# Patient Record
Sex: FEMALE | Race: WHITE | Employment: OTHER | URBAN - METROPOLITAN AREA
[De-identification: names, ages, dates, MRNs, and addresses within clinical notes are randomized per-mention and may not be internally consistent; named-entity substitution may affect disease eponyms.]

---

## 2018-03-26 ENCOUNTER — APPOINTMENT (OUTPATIENT)
Dept: PHYSICAL THERAPY | Age: 73
End: 2018-03-26

## 2018-03-27 ENCOUNTER — HOSPITAL ENCOUNTER (OUTPATIENT)
Dept: PHYSICAL THERAPY | Age: 73
Discharge: HOME | End: 2018-03-27
Payer: MEDICARE

## 2018-03-27 PROCEDURE — 97110 THERAPEUTIC EXERCISES: CPT

## 2018-03-27 PROCEDURE — 97162 PT EVAL MOD COMPLEX 30 MIN: CPT

## 2018-03-27 PROCEDURE — G8979 MOBILITY GOAL STATUS: HCPCS

## 2018-03-27 PROCEDURE — G8978 MOBILITY CURRENT STATUS: HCPCS

## 2018-03-27 NOTE — PROGRESS NOTES
PHYSICAL THERAPY - DAILY TREATMENT NOTE    Patient Name: Rosalva Graff        Date: 3/27/2018  : 1945   YES Patient  Verified  Visit #:   1   of  8  Insurance: Payor: Ashley Lab / Plan: [place] MEDICARE PART A & B / Product Type: Medicare /      In time: 245 Out time: 345p   Total Treatment Time: 60     Medicare Time Tracking (below)   Total Timed Codes (min):  15 1:1 Treatment Time:  60     TREATMENT AREA =  Bilateral knee pain [M25.561, M25.562]    SUBJECTIVE                                                         See IE            OBJECTIVE      15 min Therapeutic Exercise:  [x]  See flow sheet   Rationale:      increase ROM and increase strength to improve the patients ability to perform ADLs, IADLs. T/o tx min Patient Education:  YES  Reviewed HEP   [] A and P related to current DX [] LTGs and POC   []  Progressed/Changed HEP based on:         Other Objective/Functional Measures:                                   See IE           ASSESSMENT    [x]  See Initial Evaluation     PLAN    [x]  Upgrade activities as tolerated YES Continue plan of care   []  Discharge due to :    []  Other: Pt will return for follow up and to initiate POC     Therapist: Valentina Brice PT, Alvina QUIJANO 64    Date: 3/27/2018 Time: 2:42 PM

## 2018-03-27 NOTE — PROGRESS NOTES
[facility] Frank [address]  [facility] AT [facility]. [place], [address] - Phone: [phone]  Fax: [phone] / [address]  Patient Name: Naveed Blunt : 1945   Medical   Diagnosis: Bilateral knee pain [M25.561, M25.562] Treatment Diagnosis: B knee pain   Onset Date: 3 years     Referral Source: Marce Herrera MD [place] [facility]): 3/27/2018   Prior Hospitalization: See medical history Provider #: [id number]   Prior Level of Function: Walking step to pattern, LE weakness   Comorbidities: OA, DM, Synvisc injection L knee, Steroidal injections B. Medications: Verified on Patient Summary List     The Plan of Care and following information is based on the information from the initial evaluation.   ===========================================================================================  Assessment / key information:  Naveed Blunt is a 81 y.o.  yo female with Dx of Bilateral knee pain [M25.561, M25.562]. She reports origin is related to She currently rates her pain as aching and weakness in B knee, R > L at 8/10 at worst, 3/10 at best, improved since recent steroid injection. She reports feeling her knees are weak, per pt > 3 years, with R knee buckling frequently prior to injection. Pt reports falls related to R knee 1 1/2 years ago and 1 year ago. She complains of difficulty and increase pain with stairs, noting step to gait pattern. Goals are to get stronger and be active for as long as possible. Synvisc injections planned for R knee in 1-2 months. Objective Findings:  Gait: Pt with limited knee extension B during stance phase and min Trendelenberg, Knee AROM: R = 0-5-90 deg, L = 0-5-100 deg. Manual Muscle Testing:  Quad Set: R = poor with pain, L =fair without pain, unable to perform SLR without A, PPT with lumbar spasms.  Special Test: Pt unable to perform sit<>stand without use of UE. Pt demonstrates significant mm weakness in B LE and would benefit from PT to address LE strengthening, balance activity for safety. Pt instructed in HEP and will f/u in clinic for PT.  ======================================================= ===================================  Eval Complexity: History MEDIUM  Complexity : 1-2 comorbidities / personal factors will impact the outcome/ POC ;  Examination  MEDIUM Complexity : 3 Standardized tests and measures addressing body structure, function, activity limitation and / or participation in recreation ; Presentation MEDIUM Complexity : Evolving with changing characteristics ; Decision Making MEDIUM Complexity : FOTO score of 26-74; Overall Complexity MEDIUM  Problem List: pain affecting function, decrease ROM, decrease strength, impaired gait/ balance, decrease ADL/ functional abilitiies, decrease activity tolerance, decrease flexibility/ joint mobility and decrease transfer abilities   Treatment Plan may include any combination of the following: Therapeutic exercise, Therapeutic activities, Neuromuscular re-education, Physical agent/modality, Gait/balance training, Manual therapy, Patient education, Self Care training, Functional mobility training and Home safety training  Patient / Family readiness to learn indicated by: asking questions, trying to perform skills and interest  Persons(s) to be included in education: patient (P)  Barriers to Learning/Limitations: no  Measures taken:   None needed   Patient Goal (s): To be able to take care of grandchildren, have less pain. Rehabilitation Potential: good   Short Term Goals: To be accomplished in  1-2  weeks:  1. Independent with HEP for supine and seated there ex to improve pt status. 2. Assess Dynamic Gait Index and/or Briones Balance Score to assess current safety with transfers and gait status.   3. Pt will demonstrate ability to perform PPT and bridges without pain to indicate improved core strength and improve functional status.  Long Term Goals: To be accomplished in  3-4  weeks:  1. Improve DGI by 5 points to indicate improved balance for safe interaction in community. 2.  Increase FOTO score by 17 points to 49/100 pts to show functional improvement. 3.  Pt will demonstrate ability to perform sit<>stand without use of UE x 10 reps to indicate improved strength to improve ADLs, IADLs. 4. Improve Dynamic Gait Index by 3 or greater points to improve safety in community. Frequency / Duration:   Patient to be seen  2-3  times per week for 3-4  weeks:  Patient / Caregiver education and instruction: self care and exercises, modification of activity  G-Codes (GP): Mobility: [id number] Current  CL= 60-79%   Goal  CK= 40-59%. The severity rating is based on the FOTO Score  Therapist Signature: Purnima Haji PT Date: 7/07/5141   Certification Period: 3/27/18-6/26/18 Time: 2:42 PM   ===========================================================================================  I certify that the above Physical Therapy Services are being furnished while the patient is under my care. I agree with the treatment plan and certify that this therapy is necessary. Physician Signature:        Date:       Time:     Please sign and return to [facility] or you may fax the signed copy to [phone]. Thank you.

## 2018-03-30 ENCOUNTER — HOSPITAL ENCOUNTER (OUTPATIENT)
Dept: PHYSICAL THERAPY | Age: 73
End: 2018-03-30
Payer: MEDICARE

## 2018-04-02 ENCOUNTER — HOSPITAL ENCOUNTER (OUTPATIENT)
Dept: PHYSICAL THERAPY | Age: 73
Discharge: HOME | End: 2018-04-02
Payer: MEDICARE

## 2018-04-02 PROCEDURE — 97110 THERAPEUTIC EXERCISES: CPT

## 2018-04-02 PROCEDURE — 97140 MANUAL THERAPY 1/> REGIONS: CPT

## 2018-04-02 NOTE — PROGRESS NOTES
PHYSICAL THERAPY - DAILY TREATMENT NOTE      Patient Name: Sanam Davis        Date: 2018  : 1945   YES Patient  Verified  Visit #:   2   of   8  Insurance: Payor: Dennis Hooper / Plan: [place] MEDICARE PART A & B / Product Type: Medicare /      In time: 9 Out time: 1005   Total Treatment Time: 65     Medicare Time Tracking (below)   Total Timed Codes (min): 55 1:1 Treatment Time:  40     TREATMENT AREA =  Bilateral knee pain [M25.561, M25.562]    SUBJECTIVE    Pain Level (on 0 to 10 scale):  2  / 10   Medication Changes/New allergies or changes in medical history, any new surgeries or procedures? NO    If yes, update Summary List   Subjective Functional Status/Changes:  []  No changes reported       Functional improvements: R knee pain more severe today but was able to stand more yesterday for cooking. Functional impairments: Unable to squat, bend knee for transfers. OBJECTIVE  Modalities Rationale:     decrease edema and decrease inflammation to improve patient's ability to perform ADLs.       min [] Estim, type/location:                                      []  att     []  unatt     []  w/US     []  w/ice    []  w/heat    min []  Mechanical Traction: type/lbs                   []  pro   []  sup   []  int   []  cont    []  before manual    []  after manual    min []  Ultrasound, settings/location:      min []  Iontophoresis w/ dexamethasone, location:                                               []  take home patch       []  in clinic   10 min [x]  Ice     []  Heat    location/position: B knee in supine    min []  Vasopneumatic Device, press/temp:     min []  Other:    [x] Skin assessment post-treatment (if applicable):    [x]  intact    []  redness- no adverse reaction     []redness  adverse reaction:      45: 30 min 1:1 min Therapeutic Exercise:  [x]  See flow sheet   Rationale:      increase ROM, increase strength and improve coordination to improve the patients ability to perform ADLs.     10 min Manual Therapy: Technique:      [x] S/DTM []IASTM [x]PROM [] Passive Stretching   []manual TPR    [x]Jt manipulation:Gr I [x] II [x]  III [] IV[] V[]  Treatment Area:  R knee PROM, STM to quad. Rationale:      decrease pain and increase ROM to improve patient's ability to perform ADLs. throughout therapy min Patient Education:  YES  Reviewed HEP   []  Progressed/Changed HEP based on: Other Objective/Functional Measures:    Initiated LE strengthening program.  Added sit<>stand, standing static balance. Post Treatment Pain Level (on 0 to 10) scale:  2 / 10     ASSESSMENT    Assessment/Changes in Function:     Pt with significant LE weakness noted, inability to perform SLR R > 5 reps prior to fatigue. []  See Progress Note/Recertification   Patient will continue to benefit from skilled PT services to modify and progress therapeutic interventions, address functional mobility deficits, address ROM deficits, address strength deficits, analyze and address soft tissue restrictions, analyze and cue movement patterns, analyze and modify body mechanics/ergonomics and assess and modify postural abnormalities to attain remaining goals. Progress toward goals / Updated goals:    Pt with progress toward I with STG 1, I with HEP.      PLAN    [x]  Upgrade activities as tolerated YES Continue plan of care   []  Discharge due to :    [x]  Other: Update HEP     Therapist: Candance Manns, PT    Date: 4/2/2018 Time: 9:12 AM   Future Appointments  Date Time Provider Leander Muniz   4/4/2018 9:30 AM Cordova Fall, PT REHAB CENTER AT [facility]   4/6/2018 9:00 AM Kathy Youngblood PT REHAB CENTER AT [facility]   4/9/2018 10:30 AM Cordova Fall, PT REHAB CENTER AT [facility]   4/11/2018 8:30 AM Cordova Fall, PT REHAB CENTER AT [facility]   4/13/2018 9:00 AM Kathy Youngblood PT REHAB CENTER AT [facility]   4/16/2018 9:30 AM Cordova Fall, PT REHAB CENTER AT [facility]   4/18/2018 9:00 AM Cordova Fall, PT REHAB CENTER AT [facility]   4/23/2018 9:00 AM Omid Tan PT REHAB CENTER AT [facility]   4/25/2018 8:30 AM Masha Colvin Crystal Warren REHAB [facility]ENTER AT [facility]

## 2018-04-04 ENCOUNTER — HOSPITAL ENCOUNTER (OUTPATIENT)
Dept: PHYSICAL THERAPY | Age: 73
Discharge: HOME | End: 2018-04-04
Payer: MEDICARE

## 2018-04-04 PROCEDURE — 97112 NEUROMUSCULAR REEDUCATION: CPT

## 2018-04-04 PROCEDURE — 97140 MANUAL THERAPY 1/> REGIONS: CPT

## 2018-04-04 PROCEDURE — 97014 ELECTRIC STIMULATION THERAPY: CPT

## 2018-04-04 PROCEDURE — 97110 THERAPEUTIC EXERCISES: CPT

## 2018-04-04 NOTE — PROGRESS NOTES
PHYSICAL THERAPY - DAILY TREATMENT NOTE    Patient Name: Amna Hansen        Date: 2018  : 1945   YES Patient  Verified  Visit #:   3   of   8  Insurance: Payor: Dandre Hester / Plan: [place] MEDICARE PART A & B / Product Type: Medicare /      In time: 930 Out time: 1120   Total Treatment Time: 110     Medicare Time Tracking (below)   Total Timed Codes (min):  110 1:1 Treatment Time:  40     TREATMENT AREA = Bilateral knee pain [M25.561, M25.562]    SUBJECTIVE  Pain Level (on 0 to 10 scale):  2= R  / 10   Medication Changes/New allergies or changes in medical history, any new surgeries or procedures? NO    If yes, update Summary List   Subjective Functional Status/Changes:  []  No changes reported     R knee has constant discomfort. Sore after all the ex last time     Knees are weak.    OBJECTIVE    10 1:1, 60 min Therapeutic Exercise:  [x]  See flow sheet   Rationale:      increase ROM, increase strength and improve balance to improve the patients ability to amb/ perform ADLs     10 min Manual Therapy: Technique:      [x] STM[]IASTM[x]TPR[]PROM[x] Stretching- HS, knee ext  [] SOR[] man traction[]   []OP with REIL  []Jt manipulation []Gr I [] II []  III [] IV[] V[]    Treatment Area:  R quad   Rationale:      decrease pain, increase ROM, increase tissue extensibility and decrease trigger points to improve patient's ability to amb    20 min Neuro re-ed Exercise:  [x]  Completed RAMOS assessment   Rationale:      improve coordination, improve balance and increase proprioception to improve the patients ability to amb/ transition     Modalities Rationale:     decrease inflammation and decrease pain to improve patient's ability to amb with less pain  10 min [x] Estim, type/location: ifc                                    []  att     []  unatt     []  w/US     []  w/ice    []  w/heat    min []  Mechanical Traction: type/lbs                   []  pro   []  sup   []  int   []  cont    []  before manual    [] after manual    min []  Ultrasound, settings/location:      min []  Iontophoresis w/ dexamethasone, location:                                               []  take home patch       []  in clinic    min []  Ice     []  Heat    location/position:     min []  Vasopneumatic Device, press/temp:     min []  Other:    [x] Skin assessment post-treatment (if applicable):    [x]  intact    []  redness- no adverse reaction     []redness  adverse reaction:         min Gait Training:    Rationale:        throughout Rx min Patient Education:  YES  Reviewed HEP   []  Progressed/Changed HEP based on: Other Objective/Functional Measures:      RAMOS= 49/56   Post Treatment Pain Level (on 0 to 10) scale:   0  / 10     ASSESSMENT  Assessment/Changes in Function:     Functional improvement:  Progressed HEP-  Added PPT and clam   Functional limitation:  Balance dysfunction, weakness      []  See Progress Note/Recertification   Patient will continue to benefit from skilled PT services to modify and progress therapeutic interventions, address functional mobility deficits, address ROM deficits, address strength deficits, analyze and address soft tissue restrictions, analyze and cue movement patterns and address imbalance/dizziness to attain remaining goals.    Progress toward goals / Updated goals:    Cont with current LTGs     PLAN  [x]  Upgrade activities as tolerated YES Continue plan of care   []  Discharge due to :    []  Other:      Therapist: Wei Cerda PT    Date: 4/4/2018 Time: 9:33 AM     Future Appointments  Date Time Provider Leander Muniz   4/6/2018 9:00 AM Wei Cerda, PT REHAB [facility]ENTER AT [facility]   4/9/2018 10:30 AM Wei Cerda PT REHAB CENTER AT [facility]   4/11/2018 8:30 AM Wei Cerda PT REHAB CENTER AT [facility]   4/13/2018 9:00 AM Kathy Uriarte PT REHAB CENTER AT [facility]   4/16/2018 9:30 AM Wei Cerda, PT REHAB [facility]ENTER AT [facility]   4/18/2018 9:00 AM Wei Cerda, PT REHAB CENTER AT [facility]   4/23/2018 9:00 AM Wei Cerda, PT REHAB CENTER AT [facility]   4/25/2018 8:30 AM Patito Ballard, PT REHAB CENTER AT [facility]

## 2018-04-06 ENCOUNTER — HOSPITAL ENCOUNTER (OUTPATIENT)
Dept: PHYSICAL THERAPY | Age: 73
Discharge: HOME | End: 2018-04-06
Payer: MEDICARE

## 2018-04-06 PROCEDURE — 97110 THERAPEUTIC EXERCISES: CPT

## 2018-04-06 PROCEDURE — 97140 MANUAL THERAPY 1/> REGIONS: CPT

## 2018-04-06 NOTE — PROGRESS NOTES
PHYSICAL THERAPY - DAILY TREATMENT NOTE    Patient Name: Juliette Mckinley        Date: 2018  : 1945   YES Patient  Verified  Visit #:   4   of   8  Insurance: Payor: Gissell Pradhan / Plan: [place] MEDICARE PART A & B / Product Type: Medicare /      In time: 900 Out time: 3066   Total Treatment Time: 75     Medicare Time Tracking (below)   Total Timed Codes (min):  65 1:1 Treatment Time:  40     TREATMENT AREA = Bilateral knee pain [M25.561, M25.562]    SUBJECTIVE  Pain Level (on 0 to 10 scale):  3  / 10   Medication Changes/New allergies or changes in medical history, any new surgeries or procedures?     NO    If yes, update Summary List   Subjective Functional Status/Changes:  []  No changes reported     Sore from yesterday        OBJECTIVE    30 1:1, 45 min Therapeutic Exercise:  [x]  See flow sheet   Rationale:      increase ROM and increase strength to improve the patients ability to amb with less pain, perform ADLs     10 min Manual Therapy: Technique:      [x] STM[]IASTM[x]TPR[x]PROM[] Stretching  [] SOR[] man traction[]   []OP with REIL  []Jt manipulation []Gr I [] II []  III [] IV[] V[]    Treatment Area:  R distal quad   Rationale:      decrease pain, increase ROM, increase tissue extensibility and decrease trigger points to improve patient's ability to amb with less pain        Modalities Rationale:     decrease inflammation and decrease pain to improve patient's ability to amb with less pain  10 min [x] Estim, type/location: ifc to B knees                                     []  att     []  unatt     []  w/US     []  w/ice    []  w/heat    min []  Mechanical Traction: type/lbs                   []  pro   []  sup   []  int   []  cont    []  before manual    []  after manual    min []  Ultrasound, settings/location:      min []  Iontophoresis w/ dexamethasone, location:                                               []  take home patch       []  in clinic    min []  Ice     []  Heat location/position:     min []  Vasopneumatic Device, press/temp:     min []  Other:    [x] Skin assessment post-treatment (if applicable):    [x]  intact    [x]  redness- no adverse reaction     []redness  adverse reaction:         min Gait Training:    Rationale:        throughout Rx min Patient Education:  YES  Reviewed HEP   []  Progressed/Changed HEP based on: Other Objective/Functional Measures:    Tender B knees     Post Treatment Pain Level (on 0 to 10) scale:   2  / 10     ASSESSMENT  Assessment/Changes in Function:     Functional improvement:  Demos/ verbalizes compliance with HEP   Functional limitation:  Pain with prolonged walking      []  See Progress Note/Recertification   Patient will continue to benefit from skilled PT services to modify and progress therapeutic interventions, address functional mobility deficits, address ROM deficits, address strength deficits, analyze and address soft tissue restrictions and address imbalance/dizziness to attain remaining goals.    Progress toward goals / Updated goals:    Good knowledge of HEP     PLAN  [x]  Upgrade activities as tolerated YES Continue plan of care   []  Discharge due to :    []  Other:      Therapist: Patricia Massey PT    Date: 4/6/2018 Time: 9:11 AM     Future Appointments  Date Time Provider Leander Muniz   4/9/2018 10:30 AM [address] [place] [address], PT REHAB CENTER AT [facility]   4/11/2018 8:30 AM Patricia Massey PT REHAB CENTER AT [facility]   4/13/2018 9:00 AM Kathy Marie PT REHAB CENTER AT [facility]   4/16/2018 9:30 AM Patricia Massey PT REHAB CENTER AT [facility]   4/18/2018 9:00 AM Patricia Massey PT REHAB CENTER AT [facility]   4/23/2018 9:00 AM Patricia Massey PT REHAB CENTER AT [facility]   4/25/2018 8:30 AM Patricia Massey PT REHAB CENTER AT [facility]

## 2018-04-09 ENCOUNTER — HOSPITAL ENCOUNTER (OUTPATIENT)
Dept: PHYSICAL THERAPY | Age: 73
Discharge: HOME | End: 2018-04-09
Payer: MEDICARE

## 2018-04-09 PROCEDURE — 97140 MANUAL THERAPY 1/> REGIONS: CPT

## 2018-04-09 PROCEDURE — 97110 THERAPEUTIC EXERCISES: CPT

## 2018-04-09 NOTE — PROGRESS NOTES
PHYSICAL THERAPY - DAILY TREATMENT NOTE    Patient Name: Tom Mc        Date: 2018  : 1945   YES Patient  Verified  Visit #:   4   of   8  Insurance: Payor: Randall Avina / Plan: [place] MEDICARE PART A & B / Product Type: Medicare /      In time: 9510 Out time: 1150   Total Treatment Time: 80     Medicare Time Tracking (below)   Total Timed Codes (min):  70 1:1 Treatment Time:  40     TREATMENT AREA = Bilateral knee pain [M25.561, M25.562]    SUBJECTIVE  Pain Level (on 0 to 10 scale):  3  / 10   Medication Changes/New allergies or changes in medical history, any new surgeries or procedures? NO    If yes, update Summary List   Subjective Functional Status/Changes:  []  No changes reported     Saturday had a fall. Knee buckled and went out from under her. Landed on kneecap. Seeing MD tomorrow but no increase in pain at knee, noting soreness at wrist and ankle.      OBJECTIVE    60--1:1 30 min min Therapeutic Exercise:  [x]  See flow sheet   Rationale:      increase ROM and increase strength to improve the patients ability to amb with less pain, perform ADLs     10 min Manual Therapy: Technique:      [x] STM[]IASTM[x]TPR[x]PROM[] Stretching  [] SOR[] man traction[]   []OP with REIL  []Jt manipulation []Gr I [] II []  III [] IV[] V[]    Treatment Area:  R distal quad   Rationale:      decrease pain, increase ROM, increase tissue extensibility and decrease trigger points to improve patient's ability to amb with less pain    Modalities Rationale:     decrease inflammation and decrease pain to improve patient's ability to amb with less pain    min [] Estim, type/location:                                       []  att     []  unatt     []  w/US     []  w/ice    []  w/heat    min []  Mechanical Traction: type/lbs                   []  pro   []  sup   []  int   []  cont    []  before manual    []  after manual    min []  Ultrasound, settings/location:      min []  Iontophoresis w/ dexamethasone, location:                                               []  take home patch       []  in clinic   10 min [x]  Ice     []  Heat    location/position: Supine with LE elevation B knee    min []  Vasopneumatic Device, press/temp:     min []  Other:    [x] Skin assessment post-treatment (if applicable):    [x]  intact    [x]  redness- no adverse reaction     []redness  adverse reaction:        throughout Rx min Patient Education:  YES  Reviewed HEP   []  Progressed/Changed HEP based on: Other Objective/Functional Measures:  Reviewed HEP and discussed with pt use of a brace for stability at the knee. Post Treatment Pain Level (on 0 to 10) scale:   2  / 10     ASSESSMENT  Assessment/Changes in Function:     Functional improvement:  Increased PROM to 0-3-108  Functional limitation:  Fall on uneven surface. []  See Progress Note/Recertification   Patient will continue to benefit from skilled PT services to modify and progress therapeutic interventions, address functional mobility deficits, address ROM deficits, address strength deficits, analyze and address soft tissue restrictions and address imbalance/dizziness to attain remaining goals. Progress toward goals / Updated goals:    1. Independent with HEP for supine and seated there ex to improve pt status--met. 2. Assess Dynamic Gait Index and/or Briones Balance Score to assess current safety with transfers and gait status--met. 3. Pt will demonstrate ability to perform PPT and bridges without pain to indicate improved core strength and improve functional status--good progress currently able to perform PPT without pain.      PLAN  [x]  Upgrade activities as tolerated YES Continue plan of care   []  Discharge due to :    [x]  Other: Advance to standing balance on uneven surfaces     Therapist: Lewis Dixon PT    Date: 4/9/2018 Time: 9:11 AM     Future Appointments  Date Time Provider Leander Muniz   4/11/2018 8:30 AM Lewis Ovalles PT REHAB CENTER AT [facility] 4/13/2018 9:00 AM Kathy Navarrete, PT REH[facility]ENTER AT [facility]   4/16/2018 9:30 AM Pedro Humphries, PT REHAB CENTER AT [facility]   4/18/2018 9:00 AM Pedro Humphries, PT REHAB CENTER AT [facility]   4/23/2018 9:00 AM Pedro Humphries, PT REHAB CENTER AT [facility]   4/25/2018 8:30 AM Pedro Humphries, PT REHAB CENTER AT [facility]

## 2018-04-11 ENCOUNTER — HOSPITAL ENCOUNTER (OUTPATIENT)
Dept: PHYSICAL THERAPY | Age: 73
Discharge: HOME | End: 2018-04-11
Payer: MEDICARE

## 2018-04-11 PROCEDURE — 97140 MANUAL THERAPY 1/> REGIONS: CPT

## 2018-04-11 PROCEDURE — 97110 THERAPEUTIC EXERCISES: CPT

## 2018-04-11 NOTE — PROGRESS NOTES
PHYSICAL THERAPY - DAILY TREATMENT NOTE    Patient Name: Margaret Conway        Date: 2018  : 1945   YES Patient  Verified  Visit #:   5   of   8  Insurance: Payor: Carmela Enriquez / Plan: [place] MED[place]ARE PART A & B / Product Type: Medicare /      In time: 491 Out time: 930   Total Treatment Time: 60     Medicare Time Tracking (below)   Total Timed Codes (min):  50 1:1 Treatment Time:  30     TREATMENT AREA = Bilateral knee pain [M25.561, M25.562]    SUBJECTIVE  Pain Level (on 0 to 10 scale):  3  / 10   Medication Changes/New allergies or changes in medical history, any new surgeries or procedures? NO    If yes, update Summary List   Subjective Functional Status/Changes:  []  No changes reported     Cramping really bad last night in L foot. Better now.   Cramps since being off diuretic        OBJECTIVE    20 1:1, 40 min Therapeutic Exercise:  [x]  See flow sheet   Rationale:      increase ROM, increase strength and improve balance to improve the patients ability to amb with less pain, improved safety     10 min Manual Therapy: Technique:      [x] STM[]IASTM[x]TPR[]PROM[x] Stretching- bilat hip ER/ IR, knee ext  [] SOR[] man traction[]   []OP with REIL  []Jt manipulation []Gr I [] II []  III [] IV[] V[]    Treatment Area:  STM to R quad, stretch to Bilat knees/ hips   Rationale:      decrease pain, increase ROM, increase tissue extensibility and decrease trigger points to improve patient's ability to amb with less pain        Modalities Rationale:     decrease inflammation and decrease pain to improve patient's ability to amb with less pain   min [] Estim, type/location:                                      []  att     []  unatt     []  w/US     []  w/ice    []  w/heat    min []  Mechanical Traction: type/lbs                   []  pro   []  sup   []  int   []  cont    []  before manual    []  after manual    min []  Ultrasound, settings/location:      min []  Iontophoresis w/ dexamethasone, location: []  take home patch       []  in clinic   10 min [x]  Ice     []  Heat    location/position:     min []  Vasopneumatic Device, press/temp:     min []  Other:    [x] Skin assessment post-treatment (if applicable):    [x]  intact    [x]  redness- no adverse reaction     []redness  adverse reaction:         min Gait Training:    Rationale:        throughout Rx min Patient Education:  YES  Reviewed HEP   []  Progressed/Changed HEP based on: Other Objective/Functional Measures:    Modified there ex due to patients time constraints      Post Treatment Pain Level (on 0 to 10) scale:   2  / 10     ASSESSMENT  Assessment/Changes in Function:     Cont with current STG/LTGs     []  See Progress Note/Recertification   Patient will continue to benefit from skilled PT services to modify and progress therapeutic interventions, address functional mobility deficits, address ROM deficits, address strength deficits, analyze and address soft tissue restrictions and address imbalance/dizziness to attain remaining goals.    Progress toward goals / Updated goals:         PLAN  [x]  Upgrade activities as tolerated YES Continue plan of care   []  Discharge due to :    []  Other:      Therapist: George Ang PT    Date: 4/11/2018 Time: 8:47 AM     Future Appointments  Date Time Provider Leander Muniz   4/13/2018 9:00  [facility], PT [facility] AT [facility]   4/16/2018 9:30 AM George Ang PT REHAB CENTER AT [facility]   4/18/2018 9:00 AM George Ang PT REHAB CENTER AT [facility]   4/23/2018 9:00 AM George Ang PT REHAB CENTER AT [facility]   4/25/2018 8:30 AM George Ang PT REHAB CENTER AT [facility]

## 2018-04-13 ENCOUNTER — HOSPITAL ENCOUNTER (OUTPATIENT)
Dept: PHYSICAL THERAPY | Age: 73
Discharge: HOME | End: 2018-04-13
Payer: MEDICARE

## 2018-04-13 PROCEDURE — 97110 THERAPEUTIC EXERCISES: CPT

## 2018-04-13 PROCEDURE — 97140 MANUAL THERAPY 1/> REGIONS: CPT

## 2018-04-13 NOTE — PROGRESS NOTES
PHYSICAL THERAPY - DAILY TREATMENT NOTE    Patient Name: Oswald Lanes        Date: 2018  : 1945   YES Patient  Verified  Visit #:   6   of   8  Insurance: Payor: Isaías Hassan / Plan: [place] MEDICARE PART A & B / Product Type: Medicare /      In time: 9 Out time: 10   Total Treatment Time: 60     Medicare Time Tracking (below)   Total Timed Codes (min):  50 1:1 Treatment Time:  40     TREATMENT AREA = Bilateral knee pain [M25.561, M25.562]    SUBJECTIVE  Pain Level (on 0 to 10 scale):  0 / 10   Medication Changes/New allergies or changes in medical history, any new surgeries or procedures? NO    If yes, update Summary List   Subjective Functional Status/Changes:  []  No changes reported     Feeling less pain overall to 0/10. Dr. Joyce Smithmon her meds and feeling better with her cramping. ROM is improving, balance exercises are helping with walking in community. OBJECTIVE    30 1:1, 40 min Therapeutic Exercise:  [x]  See flow sheet   Rationale:      increase ROM, increase strength and improve balance to improve the patients ability to amb with less pain, improved safety     10 min Manual Therapy: Technique:      [x] STM[]IASTM[x]TPR[]PROM[x] Stretching- bilat hip ER/ IR, knee ext  [] SOR[] man traction[]   []OP with REIL  []Jt manipulation []Gr I [] II []  III [] IV[] V[]    Treatment Area:  STM, IASTM to R quad, hamstrings f/b manual stretching and joint mobility 1-4 for ext and flexion.    Rationale:      decrease pain, increase ROM, increase tissue extensibility and decrease trigger points to improve patient's ability to amb with less pain    Modalities Rationale:     decrease inflammation and decrease pain to improve patient's ability to amb with less pain   min [] Estim, type/location:                                      []  att     []  unatt     []  w/US     []  w/ice    []  w/heat    min []  Mechanical Traction: type/lbs                   []  pro   []  sup   []  int   []  cont    []  before manual    []  after manual    min []  Ultrasound, settings/location:      min []  Iontophoresis w/ dexamethasone, location:                                               []  take home patch       []  in clinic   10 min [x]  Ice     []  Heat    location/position: B knee in supine with LE elevation    min []  Vasopneumatic Device, press/temp:     min []  Other:    [x] Skin assessment post-treatment (if applicable):    [x]  intact    [x]  redness- no adverse reaction     []redness  adverse reaction:        throughout Rx min Patient Education:  YES  Reviewed HEP   []  Progressed/Changed HEP based on: Other Objective/Functional Measures:    AAROM 0-2-110   Post Treatment Pain Level (on 0 to 10) scale:   2  / 10     ASSESSMENT  Assessment/Changes in Function:     Pt with good progress toward LTGs and improving balance. []  See Progress Note/Recertification   Patient will continue to benefit from skilled PT services to modify and progress therapeutic interventions, address functional mobility deficits, address ROM deficits, address strength deficits, analyze and address soft tissue restrictions and address imbalance/dizziness to attain remaining goals. Progress toward goals / Updated goals:  1. Independent with HEP for supine and seated there ex to improve pt status--met. 2. Assess Dynamic Gait Index and/or Briones Balance Score to assess current safety with transfers and gait status--met. 3. Pt will demonstrate ability to perform PPT and bridges without pain to indicate improved core strength and improve functional status--bridges x 5. Good progress to LTGs.      PLAN  [x]  Upgrade activities as tolerated YES Continue plan of care   []  Discharge due to :    []  Other:      Therapist: Neyda Renae PT    Date: 4/13/2018 Time: 9:49 AM     Future Appointments  Date Time Provider Leander Muniz   4/16/2018 9:30 AM Sukhwinder Ordoñez, PT REHAB CENTER AT [facility]   4/18/2018 9:00 AM Sukhwinder Ordoñez PT REHAB CENTER AT [facility]   4/23/2018 9:00 AM George Ang, PT REHAB CENTER AT [facility]   4/25/2018 8:30 AM George Ang PT REHAB CENTER AT [facility]

## 2018-04-16 ENCOUNTER — HOSPITAL ENCOUNTER (OUTPATIENT)
Dept: PHYSICAL THERAPY | Age: 73
Discharge: HOME | End: 2018-04-16
Payer: MEDICARE

## 2018-04-16 PROCEDURE — 97140 MANUAL THERAPY 1/> REGIONS: CPT

## 2018-04-16 PROCEDURE — 97110 THERAPEUTIC EXERCISES: CPT

## 2018-04-16 NOTE — PROGRESS NOTES
PHYSICAL THERAPY - DAILY TREATMENT NOTE    Patient Name: Ramez Lawson        Date: 2018  : 1945   YES Patient  Verified  Visit #:   8     Insurance: Payor: Madeline Veras / Plan: [place] MEDICARE PART A & B / Product Type: Medicare /      In time: 214 Out time: 3293   Total Treatment Time: 80     Medicare Time Tracking (below)   Total Timed Codes (min):  70 1:1 Treatment Time:  40     TREATMENT AREA = Bilateral knee pain [M25.561, M25.562]    SUBJECTIVE  Pain Level (on 0 to 10 scale):  0  / 10   Medication Changes/New allergies or changes in medical history, any new surgeries or procedures? NO    If yes, update Summary List   Subjective Functional Status/Changes:  []  No changes reported     Doing much better. Overall- 70% better. Legs seem stronger. Not getting leg cramps. Took a 2 block walk yesterday.           OBJECTIVE    30 1:1, 60 min Therapeutic Exercise:  [x]  See flow sheet   Rationale:      increase ROM and increase strength to improve the patients ability to amb with less pain, bettter stability     10 min Manual Therapy: Technique:      [x] STM[]IASTM[x]TPR[]PROM[x] Stretching- ext  [] SOR[] man traction[]   []OP with REIL  []Jt manipulation []Gr I [] II []  III [] IV[] V[]    Treatment Area:  B knees   Rationale:      decrease pain, increase ROM, increase tissue extensibility and decrease trigger points to improve patient's ability to amb safely        Modalities Rationale:     decrease inflammation and decrease pain to improve patient's ability to amb   min [] Estim, type/location:                                      []  att     []  unatt     []  w/US     []  w/ice    []  w/heat    min []  Mechanical Traction: type/lbs                   []  pro   []  sup   []  int   []  cont    []  before manual    []  after manual    min []  Ultrasound, settings/location:      min []  Iontophoresis w/ dexamethasone, location:                                               []  take home patch []  in clinic   10 min [x]  Ice     []  Heat    location/position:     min []  Vasopneumatic Device, press/temp:     min []  Other:    [x] Skin assessment post-treatment (if applicable):    [x]  intact    [x]  redness- no adverse reaction     []redness  adverse reaction:         min Gait Training:    Rationale:        throughout Rx min Patient Education:  YES  Reviewed HEP   []  Progressed/Changed HEP based on: Other Objective/Functional Measures:    Increased reps as noted on flow sheet. Progressed PPT to bridge     Post Treatment Pain Level (on 0 to 10) scale:   0  / 10     ASSESSMENT  Assessment/Changes in Function:     Functional improvement:  Going up stairs better with reciprocal pattern        []  See Progress Note/Recertification   Patient will continue to benefit from skilled PT services to modify and progress therapeutic interventions, address functional mobility deficits, address ROM deficits, address strength deficits, analyze and address soft tissue restrictions, analyze and cue movement patterns and address imbalance/dizziness to attain remaining goals.    Progress toward goals / Updated goals:    Progressing towards LTGs     PLAN  []  Upgrade activities as tolerated YES Continue plan of care   []  Discharge due to :    []  Other:      Therapist: Sara Cardenas PT    Date: 4/16/2018 Time: 9:26 AM     Future Appointments  Date Time Provider Leander Muniz   4/16/2018 9:30 AM Sara Cardenas PT REHAB CENTER AT [facility]   4/18/2018 9:00 AM Sara Cardenas PT REHAB CENTER AT [facility]   4/23/2018 9:00 AM Sara Cardenas PT REHAB CENTER AT [facility]   4/25/2018 8:30 AM Sara Cardenas PT REHAB CENTER AT [facility]

## 2018-04-18 ENCOUNTER — HOSPITAL ENCOUNTER (OUTPATIENT)
Dept: PHYSICAL THERAPY | Age: 73
Discharge: HOME | End: 2018-04-18
Payer: MEDICARE

## 2018-04-18 PROCEDURE — 97110 THERAPEUTIC EXERCISES: CPT

## 2018-04-18 PROCEDURE — 97140 MANUAL THERAPY 1/> REGIONS: CPT

## 2018-04-18 NOTE — PROGRESS NOTES
PHYSICAL THERAPY - DAILY TREATMENT NOTE    Patient Name: Juliette Mckinley        Date: 2018  : 1945   YES Patient  Verified  Visit #:     Insurance: Payor: Gissell Pradhan / Plan: [place] MEDICARE PART A & B / Product Type: Medicare /      In time: 900 Out time: 1631   Total Treatment Time: 75     Medicare Time Tracking (below)   Total Timed Codes (min):  65 1:1 Treatment Time:  40     TREATMENT AREA = Bilateral knee pain [M25.561, M25.562]    SUBJECTIVE  Pain Level (on 0 to 10 scale):  2  / 10   Medication Changes/New allergies or changes in medical history, any new surgeries or procedures? NO    If yes, update Summary List   Subjective Functional Status/Changes:  []  No changes reported     Overall much better. When I came, I could barely walk. Things are better. Today reports mild tightness-probably from the cold weather.   Had some cramping again last night        OBJECTIVE    30 1:1,   45 min Therapeutic Exercise:  [x]  See flow sheet   Rationale:      increase ROM, increase strength and improve balance to improve the patients ability to amb with less pain     10 min Manual Therapy: Technique:      [x] STM[]IASTM[x]TPR[]PROM[x] Stretching- knee ext  [] SOR[] man traction[]   []OP with REIL  []Jt manipulation []Gr I [] II []  III [] IV[] V[]    Treatment Area:  R knee   Rationale:      decrease pain, increase ROM, increase tissue extensibility and decrease trigger points to improve patient's ability to amb with less pain        Modalities Rationale:     decrease inflammation and decrease pain to improve patient's ability to amb with less pain   min [] Estim, type/location:                                      []  att     []  unatt     []  w/US     []  w/ice    []  w/heat    min []  Mechanical Traction: type/lbs                   []  pro   []  sup   []  int   []  cont    []  before manual    []  after manual    min []  Ultrasound, settings/location:      min []  Iontophoresis w/ dexamethasone, location:                                               []  take home patch       []  in clinic   10 min [x]  Ice     []  Heat    location/position:     min []  Vasopneumatic Device, press/temp:     min []  Other:    [x] Skin assessment post-treatment (if applicable):    [x]  intact    []  redness- no adverse reaction     []redness  adverse reaction:         min Gait Training:    Rationale:        throughout Rx min Patient Education:  YES  Reviewed HEP   []  Progressed/Changed HEP based on: Other Objective/Functional Measures:    Chi good understanding of HEP     Post Treatment Pain Level (on 0 to 10) scale:   2  / 10     ASSESSMENT  Assessment/Changes in Function:     Functional improvement:  amb with less pain        []  See Progress Note/Recertification   Patient will continue to benefit from skilled PT services to modify and progress therapeutic interventions, address functional mobility deficits, address ROM deficits, address strength deficits, analyze and address soft tissue restrictions and address imbalance/dizziness to attain remaining goals.    Progress toward goals / Updated goals:    Steady progress     PLAN  []  Upgrade activities as tolerated YES Continue plan of care   []  Discharge due to :    []  Other:      Therapist: Patricia Massey PT    Date: 4/18/2018 Time: 9:08 AM     Future Appointments  Date Time Provider Leander Muniz   4/23/2018 9:00 AM Patricia Massey PT REHAB CENTER AT [facility]   4/25/2018 8:30 AM Patricia Massey PT REHAB CENTER AT [facility]

## 2018-04-23 ENCOUNTER — HOSPITAL ENCOUNTER (OUTPATIENT)
Dept: PHYSICAL THERAPY | Age: 73
Discharge: HOME | End: 2018-04-23
Payer: MEDICARE

## 2018-04-23 PROCEDURE — G8980 MOBILITY D/C STATUS: HCPCS

## 2018-04-23 PROCEDURE — G8979 MOBILITY GOAL STATUS: HCPCS

## 2018-04-23 PROCEDURE — 97110 THERAPEUTIC EXERCISES: CPT

## 2018-04-23 PROCEDURE — 97140 MANUAL THERAPY 1/> REGIONS: CPT

## 2018-04-23 NOTE — PROGRESS NOTES
PHYSICAL THERAPY - DAILY TREATMENT NOTE    Patient Name: Jin Banegas        Date: 2018  : 1945   YES Patient  Verified  Visit #:   10     Insurance: Payor: Jed Smiley / Plan: [place] MEDICARE PART A & B / Product Type: Medicare /      In time: 900 Out time: 1000   Total Treatment Time: 60     Medicare Time Tracking (below)   Total Timed Codes (min):  50 1:1 Treatment Time: 25     TREATMENT AREA = Bilateral knee pain [M25.561, M25.562]    SUBJECTIVE  Pain Level (on 0 to 10 scale):  0  / 10   Medication Changes/New allergies or changes in medical history, any new surgeries or procedures? NO    If yes, update Summary List   Subjective Functional Status/Changes:  []  No changes reported     So much better.   80% overall improvement        OBJECTIVE    40, 15 1:1 min Therapeutic Exercise:  [x]  See flow sheet   Rationale:      increase ROM, increase strength and improve balance to improve the patients ability to amb     10 min Manual Therapy: Technique:      [x] STM[]IASTM[x]TPR[]PROM[] Stretching  [] SOR[] man traction[]   []OP with REIL  []Jt manipulation []Gr I [] II []  III [] IV[] V[]    Treatment Area: R quad/ distal HS    Rationale:      decrease pain, increase ROM, increase tissue extensibility and decrease trigger points to improve patient's ability to amb        Modalities Rationale:     decrease inflammation and decrease pain to improve patient's ability to amb   min [] Estim, type/location:                                      []  att     []  unatt     []  w/US     []  w/ice    []  w/heat    min []  Mechanical Traction: type/lbs                   []  pro   []  sup   []  int   []  cont    []  before manual    []  after manual    min []  Ultrasound, settings/location:      min []  Iontophoresis w/ dexamethasone, location:                                               []  take home patch       []  in clinic   10 min []  Ice     []  Heat    location/position:     min []  Vasopneumatic Device, press/temp:     min []  Other:    [x] Skin assessment post-treatment (if applicable):    [x]  intact    []  redness- no adverse reaction     []redness  adverse reaction:         min Gait Training:    Rationale:        throughout Rx min Patient Education:  YES  Reviewed HEP   []  Progressed/Changed HEP based on: Other Objective/Functional Measures:    Able to perform 10 sit to stands without UE assist  RAMOS= 53/ 56    FOTO= 49   Post Treatment Pain Level (on 0 to 10) scale:   0  / 10     ASSESSMENT  Assessment/Changes in Function:     Functional improvement:  amb with less pain        []  See Progress Note/Recertification   Patient will continue to benefit from skilled PT services to modify and progress therapeutic interventions, address functional mobility deficits, address ROM deficits, address strength deficits, analyze and address soft tissue restrictions and address imbalance/dizziness to attain remaining goals.    Progress toward goals / Updated goals:    Meeting/ progressing towards LTGs     PLAN  []  Upgrade activities as tolerated YES Continue plan of care   []  Discharge due to :    [x]  Other: 1 addl visit to re emphasize HEP     Therapist: Fabi Clark, PT    Date: 4/23/2018 Time: 9:21 AM     Future Appointments  Date Time Provider Leander Muniz   4/25/2018 8:30 AM Fabi Clark, [facility]

## 2018-04-23 NOTE — PROGRESS NOTES
[address]  [facility] AT [address] [facility], [address] [place] [address], [address] [place] [address], [address] [place] [address]  Phone: [phone]  Fax: [phone]  PROGRESS NOTE  Patient Name: Lila Castro : 1945   Treatment/Medical Diagnosis: Bilateral knee pain [U01.530, M25.562]   Referral Source: Mery Bolanos MD     Date of Initial Visit: 3/27/18 Attended Visits: 10 Missed Visits: -     SUMMARY OF TREATMENT  PT has consisted of manual therapy, therapeutic exercise (ROM, strength, stability, balance), modalities for pain relief and patient education of HEP, activity modification  CURRENT STATUS/ FUNCTION  Ms. Mando Whittaker has progressed well through this course of PT. She reports 80% improvement in symptoms. She states she is now able to ambulate in community with minimal pain. Sit to stand transitions are achieved more easily and with less pain/ effort     Previous Goals:  1. Improve RAMOS by 5 points to indicate improved balance for safe interaction in community. 2.  Increase FOTO score by 17 points to 49/100 pts to show functional improvement. 3.  Pt will demonstrate ability to perform sit<>stand without use of UE x 10 reps to indicate improved strength to improve ADLs, IADLs. Prior Level/Current Level:  1) Prior Level: 49/56   Current Level: 53/56 (increase of 4)   Goal Met? progressing  2) Prior Level: 32   Current Level: 49 (increase of 17)   Goal Met? yes  3) Prior Level: required UE assist   Current Level: independent without use of UE   Goal Met? yes      New Goals to be achieved in __1__  treatments:  Patient to be independent with finalized HEP  G-Codes: Mobility   Goal  CK= 40-59%  D/C  CK= 40-59%. The severity rating is based on the FOTO Score  RECOMMENDATIONS  Continue 1 addl visit to reinforce HEP  If you have any questions/comments please contact us directly at [phone]. Thank you for allowing us to assist in the care of your patient.     Therapist Signature: Nicolette Mane, PT Date: 4/23/2018   Recording period 3/27/18 to 4/23/18 Time: 1:32 PM   NOTE TO PHYSICIAN:  PLEASE COMPLETE THE ORDERS BELOW AND FAX TO   [facility] [place]: [phone]. If you are unable to process this request in 24 hours please contact our office: [phone].    ___ I have read the above report and request that my patient continue as recommended.   ___ I have read the above report and request that my patient continue therapy with the following changes/special instructions:_________________________________________________________   ___ I have read the above report and request that my patient be discharged from therapy.      Physician Signature:        Date:       Time:

## 2018-04-25 ENCOUNTER — HOSPITAL ENCOUNTER (OUTPATIENT)
Dept: PHYSICAL THERAPY | Age: 73
Discharge: HOME | End: 2018-04-25
Payer: MEDICARE

## 2018-04-25 PROCEDURE — G8978 MOBILITY CURRENT STATUS: HCPCS

## 2018-04-25 PROCEDURE — 97140 MANUAL THERAPY 1/> REGIONS: CPT

## 2018-04-25 PROCEDURE — G8980 MOBILITY D/C STATUS: HCPCS

## 2018-04-25 PROCEDURE — G8979 MOBILITY GOAL STATUS: HCPCS

## 2018-04-25 PROCEDURE — 97110 THERAPEUTIC EXERCISES: CPT

## 2018-04-25 NOTE — PROGRESS NOTES
PHYSICAL THERAPY - DAILY TREATMENT NOTE    Patient Name: Lila Castro        Date: 2018  : 1945   YES Patient  Verified  Visit #:     Insurance: Payor: Mayo De Leon / Plan: [place] PART A & B / Product Type: Medicare /      In time: 948 Out time: 914   Total Treatment Time: 50     Medicare Time Tracking (below)   Total Timed Codes (min):  40 1:1 Treatment Time:  30     TREATMENT AREA = Bilateral knee pain [M25.561, M25.562]    SUBJECTIVE  Pain Level (on 0 to 10 scale):  0  / 10   Medication Changes/New allergies or changes in medical history, any new surgeries or procedures? NO    If yes, update Summary List   Subjective Functional Status/Changes:  []  No changes reported     Feeling good.   Stronger  I am able to do the ex at home       OBJECTIVE    20 1:1, 30 min Therapeutic Exercise:  [x]  See flow sheet   Rationale:      increase ROM, increase strength and improve balance to improve the patients ability to amb/ transfer     10 min Manual Therapy: Technique:      [x] STM[]IASTM[x]TPR[]PROM[x] Stretching- ext  [] SOR[] man traction[]   []OP with REIL  []Jt manipulation []Gr I [] II []  III [] IV[] V[]    Treatment Area: R knee    Rationale:      decrease pain, increase ROM, increase tissue extensibility and decrease trigger points to improve patient's ability to amb/ transition        Modalities Rationale:     decrease pain and increase tissue extensibility to improve patient's ability to amb with less pain   min [] Estim, type/location:                                      []  att     []  unatt     []  w/US     []  w/ice    []  w/heat    min []  Mechanical Traction: type/lbs                   []  pro   []  sup   []  int   []  cont    []  before manual    []  after manual    min []  Ultrasound, settings/location:      min []  Iontophoresis w/ dexamethasone, location:                                               []  take home patch       []  in clinic   10 min [x]  Ice     [] Heat    location/position:     min []  Vasopneumatic Device, press/temp:     min []  Other:    [x] Skin assessment post-treatment (if applicable):    [x]  intact    []  redness- no adverse reaction     []redness  adverse reaction:         min Gait Training:    Rationale:        throughout Rx min Patient Education:  YES  Reviewed HEP   []  Progressed/Changed HEP based on: Other Objective/Functional Measures:    indep HEP   Post Treatment Pain Level (on 0 to 10) scale:   0  / 10     ASSESSMENT  Assessment/Changes in Function:        [x]  See Progress Note/Recertification   Patient will continue to benefit from skilled PT services to modify and progress therapeutic interventions, address functional mobility deficits, address ROM deficits and address strength deficits to attain remaining goals. Progress toward goals / Updated goals:    Reaching / progressing to [facility]  []  Upgrade activities as tolerated YES Continue plan of care   [x]  Discharge due to :    []  Other:      Therapist: Lewis Ovalles, PT    Date: 4/25/2018 Time: 8:31 AM     No future appointments.

## 2018-04-25 NOTE — PROGRESS NOTES
Hira Frank 33 [address] [facility] PHYSICAL THERAPY AT [address] [facility], [address] [place] [address], [address] [facility], [address] [place] [address]  Phone: [phone]  Fax: [phone]  DISCHARGE NOTE            Patient Name: Janee Reyes : 1945   Treatment/Medical Diagnosis: Bilateral knee pain [U35.864, M25.562]   Referral Source: Giuliano Kapadia MD       Date of Initial Visit: 3/27/18 Attended Visits: 11 Missed Visits: -      SUMMARY OF TREATMENT  PT has consisted of manual therapy, therapeutic exercise (ROM, strength, stability, balance), modalities for pain relief and patient education of HEP, activity modification  CURRENT STATUS/ FUNCTION  Ms. Adalgisa Leonard has progressed well through this course of PT. She reports 80% improvement in symptoms. She states she is now able to ambulate in community with minimal pain. Sit to stand transitions are achieved more easily and with less pain/ effort                              Previous Goals:  1.  Improve RAMOS by 5 points to indicate improved balance for safe interaction in community. 2.  Increase FOTO score by 17 points to 49/100 pts to show functional improvement. 3.  Pt will demonstrate ability to perform sit<>stand without use of UE x 10 reps to indicate improved strength to improve ADLs, IADLs. 4. Patient to be indep with HEP                             Prior Level/Current Level:  1) Prior Level: 49/56                           Current Level: 53/56 (increase of 4)                           Goal Met? progressing  2) Prior Level: 32                           Current Level: 49 (increase of 17)                           Goal Met? yes  3) Prior Level: required UE assist                           Current Level: independent without use of UE                           Goal Met?  Yes  2) Prior Level: na                           Current Level: independent and compliant                           Goal Met? yes            G-Codes: Mobility:  Current  CK= 40-59%   Goal  CK= 40-59%  D/C  CK= 40-59%. The severity rating is based on the FOTO Score      RECOMMENDATIONS  Discharge patient from PT    If you have any questions/comments please contact us directly at [phone].    Thank you for allowing us to assist in the care of your patient.     Therapist Signature: Pedro Humphries, PT Date: 2/95/1365   Cert  period 5/24/35 to 6/26/18 Time: 5:20 PM